# Patient Record
Sex: FEMALE | Race: WHITE | Employment: FULL TIME | URBAN - METROPOLITAN AREA
[De-identification: names, ages, dates, MRNs, and addresses within clinical notes are randomized per-mention and may not be internally consistent; named-entity substitution may affect disease eponyms.]

---

## 2021-10-19 ENCOUNTER — OFFICE VISIT (OUTPATIENT)
Dept: NEUROLOGY | Age: 31
End: 2021-10-19
Payer: COMMERCIAL

## 2021-10-19 VITALS — SYSTOLIC BLOOD PRESSURE: 144 MMHG | RESPIRATION RATE: 20 BRPM | DIASTOLIC BLOOD PRESSURE: 96 MMHG

## 2021-10-19 DIAGNOSIS — S06.0X0A CONCUSSION WITHOUT LOSS OF CONSCIOUSNESS, INITIAL ENCOUNTER: Primary | ICD-10-CM

## 2021-10-19 DIAGNOSIS — G44.86 CERVICOGENIC HEADACHE: ICD-10-CM

## 2021-10-19 DIAGNOSIS — R42 DIZZINESS: ICD-10-CM

## 2021-10-19 DIAGNOSIS — R11.2 INTRACTABLE VOMITING WITH NAUSEA, UNSPECIFIED VOMITING TYPE: ICD-10-CM

## 2021-10-19 PROCEDURE — 99205 OFFICE O/P NEW HI 60 MIN: CPT | Performed by: PSYCHIATRY & NEUROLOGY

## 2021-10-19 RX ORDER — ONDANSETRON 4 MG/1
4 TABLET, ORALLY DISINTEGRATING ORAL
Qty: 60 TABLET | Refills: 0 | Status: SHIPPED | OUTPATIENT
Start: 2021-10-19 | End: 2022-02-28

## 2021-10-19 RX ORDER — AMITRIPTYLINE HYDROCHLORIDE 10 MG/1
TABLET, FILM COATED ORAL
Qty: 90 TABLET | Refills: 1 | Status: SHIPPED | OUTPATIENT
Start: 2021-10-19 | End: 2021-12-01 | Stop reason: SDUPTHER
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
## 2021-10-19 NOTE — LETTER
10/22/2021    Patient: Olivia Marquez   YOB: 1990   Date of Visit: 10/19/2021     [facility] [id number]  Via Fax: [phone]    Dear Reese Mercer MD,      Thank you for referring Ms. Shaye Montoya to [facility] for evaluation. My notes for this consultation are attached. If you have questions, please do not hesitate to call me. I look forward to following your patient along with you.       Sincerely,    Lorenzo Alberts MD

## 2021-10-19 NOTE — PATIENT INSTRUCTIONS
Concussion: Care Instructions  Your Care Instructions     A concussion is a kind of injury to the brain. It happens when the head receives a hard blow. The impact can jar or shake the brain against the skull. This interrupts the brain's normal activities. Although you may have cuts or bruises on your head or face, you may have no other visible signs of a brain injury. In most cases, damage to the brain from a concussion can't be seen in tests such as a CT or MRI scan. For a few weeks, you may have low energy, dizziness, trouble sleeping, a headache, ringing in your ears, or nausea. You may also feel anxious, grumpy, or depressed. You may have problems with memory and concentration. These symptoms are common after a concussion. They should slowly improve over time. Sometimes this takes weeks or even months. Someone who lives with you should know how to care for you. Please share this and all information with a caregiver who will be available to help if needed. Follow-up care is a key part of your treatment and safety. Be sure to make and go to all appointments, and call your doctor if you are having problems. It's also a good idea to know your test results and keep a list of the medicines you take. How can you care for yourself at home? Pain control  · Put ice or a cold pack on the part of your head that hurts for 10 to 20 minutes at a time. Put a thin cloth between the ice and your skin. · Be safe with medicines. Read and follow all instructions on the label. ? If the doctor gave you a prescription medicine for pain, take it as prescribed. ? If you are not taking a prescription pain medicine, ask your doctor if you can take an over-the-counter medicine. Recovery  · Follow your doctor's instructions. He or she will tell you if you need someone to watch you closely for the next 24 hours or longer. · Rest is the best way to recover from a concussion.  You need to rest your body and your brain:  ? Get plenty of sleep at night. And take rest breaks during the day. ? Avoid activities that take a lot of physical or mental work. This includes housework, exercise, schoolwork, video games, text messaging, and using the computer. ? You may need to change your school or work schedule while you recover. ? Return to your normal activities slowly. Do not try to do too much at once. · Do not drink alcohol or use illegal drugs. Alcohol and illegal drugs can slow your recovery. And they can increase your risk of a second brain injury. · Avoid activities that could lead to another concussion. Follow your doctor's instructions for a gradual return to activity and sports. · Ask your doctor when it's okay for you to drive a car, ride a bike, or operate machinery. How should you return to activity? Your return to activity can begin after 1 to 2 days of physical and mental rest. After resting, you can gradually increase your activity as long as it does not cause new symptoms or worsen your symptoms. Doctors and concussion specialists suggest steps to follow for returning to sports after a concussion. Use these steps as a guide. You should slowly progress through the following levels of activity:  1. Limited activity. You can take part in daily activities as long as the activity doesn't increase your symptoms or cause new symptoms. 2. Light aerobic activity. This can include walking, swimming, or other exercise at less than 70% of maximum heart rate. No resistance training is included in this step. 3. Sport-specific exercise. This includes running drills or skating drills (depending on the sport), but no head impact. 4. Noncontact training drills. This includes more complex training drills such as passing. The athlete may also begin light resistance training. 5. Full-contact practice. The athlete can participate in normal training. 6. Return to normal game play.  This is the final step and allows the athlete to join in normal game play. Watch and keep track of your progress. It should take at least 6 days for you to go from light activity to normal game play. Make sure that you can stay at each new level of activity for at least 24 hours without symptoms, or as long as your doctor says, before doing more. If one or more symptoms come back, return to a lower level of activity for at least 24 hours. Don't move on until all symptoms are gone. When should you call for help? Call 911 anytime you think you may need emergency care. For example, call if:    · You have a seizure.     · You passed out (lost consciousness).     · You are confused or can't stay awake. Call your doctor now or seek immediate medical care if:    · You have new or worse vomiting.     · You feel less alert.     · You have new weakness or numbness in any part of your body. Watch closely for changes in your health, and be sure to contact your doctor if:    · You do not get better as expected.     · You have new symptoms, such as headaches, trouble concentrating, or changes in mood. Where can you learn more? Go to http://www.gray.com/  Enter F336 in the search box to learn more about \"Concussion: Care Instructions. \"  Current as of: April 8, 2021               Content Version: 13.0  © 1861-2772 [employer]. Care instructions adapted under license by [employer] (which disclaims liability or warranty for this information). If you have questions about a medical condition or this instruction, always ask your healthcare professional. [place] [address] any warranty or liability for your use of this information. Neck: Exercises  Introduction  Here are some examples of exercises for you to try. The exercises may be suggested for a condition or for rehabilitation. Start each exercise slowly. Ease off the exercises if you start to have pain.   You will be told when to start these exercises and which ones will work best for you. How to do the exercises  Neck stretch    1. This stretch works best if you keep your shoulder down as you lean away from it. To help you remember to do this, start by relaxing your shoulders and lightly holding on to your thighs or your chair. 2. Tilt your head toward your shoulder and hold for 15 to 30 seconds. Let the weight of your head stretch your muscles. 3. If you would like a little added stretch, use your hand to gently and steadily pull your head toward your shoulder. For example, keeping your right shoulder down, lean your head to the left. 4. Repeat 2 to 4 times toward each shoulder. Diagonal neck stretch    1. Turn your head slightly toward the direction you will be stretching, and tilt your head diagonally toward your chest and hold for 15 to 30 seconds. 2. If you would like a little added stretch, use your hand to gently and steadily pull your head forward on the diagonal.  3. Repeat 2 to 4 times toward each side. Dorsal glide stretch    The dorsal glide stretches the back of the neck. If you feel pain, do not glide so far back. Some people find this exercise easier to do while lying on their backs with an ice pack on the neck. 1. Sit or stand tall and look straight ahead. 2. Slowly tuck your chin as you glide your head backward over your body  3. Hold for a count of 6, and then relax for up to 10 seconds. 4. Repeat 8 to 12 times. Chest and shoulder stretch    1. Sit or stand tall and glide your head backward as in the dorsal glide stretch. 2. Raise both arms so that your hands are next to your ears. 3. Take a deep breath, and as you breathe out, lower your elbows down and behind your back. You will feel your shoulder blades slide down and together, and at the same time you will feel a stretch across your chest and the front of your shoulders. 4. Hold for about 6 seconds, and then relax for up to 10 seconds. 5. Repeat 8 to 12 times.   Strengthening: Hands on head    1. Move your head backward, forward, and side to side against gentle pressure from your hands, holding each position for about 6 seconds. 2. Repeat 8 to 12 times. Follow-up care is a key part of your treatment and safety. Be sure to make and go to all appointments, and call your doctor if you are having problems. It's also a good idea to know your test results and keep a list of the medicines you take. Where can you learn more? Go to http://www.worley.com/  Enter P975 in the search box to learn more about \"Neck: Exercises. \"  Current as of: July 1, 2021               Content Version: 13.0  © 9374-8167 [employer]. Care instructions adapted under license by [employer] (which disclaims liability or warranty for this information). If you have questions about a medical condition or this instruction, always ask your healthcare professional. [place] [address] any warranty or liability for your use of this information. Shoulder Blade: Exercises  Introduction  Here are some examples of exercises for you to try. The exercises may be suggested for a condition or for rehabilitation. Start each exercise slowly. Ease off the exercises if you start to have pain. You will be told when to start these exercises and which ones will work best for you. How to do the exercises  Shoulder roll    1. Stand tall with your chin slightly tucked. Imagine that a string at the top of your head is pulling you straight up. 2. Keep your arms relaxed. All motion will be in your shoulders. 3. Shrug your shoulders up toward your ears, then up and back. [place] your shoulders down and back, like you're sliding your hands down into your back pants pockets. 4. Repeat the circles at least 2 to 4 times. 5. This exercise is also helpful anytime you want to relax. Lower neck and upper back stretch    1.  With your arms about shoulder height, clasp your hands in front of you. 2. Drop your chin toward your chest.  3. Reach straight forward so you are rounding your upper back. Think about pulling your shoulder blades apart. [place]ina [place] feel a stretch across your upper back and shoulders. Hold for at least 6 seconds. 4. Repeat 2 to 4 times. Triceps stretch    1. Reach your arm straight up. 2. Keeping your elbow in place, bend your arm and reach your hand down behind your back. 3. With your other hand, apply gentle pressure to the bent elbow. [place] feel a stretch at the back of your upper arm and shoulder. Hold about 6 seconds. 4. Repeat 2 to 4 times with each arm. Shoulder stretch    1. Relax your shoulders. 2. Raise one arm to shoulder height, and reach it across your chest.  3. Pull the arm slightly toward you with your other arm. This will help you get a gentle stretch. Hold for about 6 seconds. 4. Repeat 2 to 4 times. Shoulder blade squeeze    1. Sit or stand up tall with your arms at your sides. 2. Keep your shoulders relaxed and down, not shrugged. 3. Squeeze your shoulder blades together. Hold for 6 seconds, then relax. 4. Repeat 8 to 12 times. Straight-arm shoulder blade squeeze    1. Sit or stand tall. Relax your shoulders. 2. With palms down, hold your elastic tubing or band straight out in front of you. 3. Start with slight tension in the tubing or band, with your hands about shoulder-width apart. 4. Slowly pull straight out to the sides, squeezing your shoulder blades together. Keep your arms straight and at shoulder height. Slowly release. 5. Repeat 8 to 12 times. Rowing    1. [place] your elastic tubing or band at about waist height. Take one end in each hand. 2. Sit or stand with your feet hip-width apart. 3. Hold your arms straight in front of you. Adjust your distance to create slight tension in the tubing or band. 4. Slightly tuck your chin. Relax your shoulders. 5. Without shrugging your shoulders, pull straight back.  Your elbows will pass alongside your waist.  Pull-downs    1. [place] your elastic tubing or band in the top of a closed door. Take one end in each hand. 2. Either sit or stand, depending on what is more comfortable. If you feel unsteady, sit on a chair. 3. Start with your arms up and comfortably apart, elbows straight. There should be a slight tension in the tubing or band. 4. Slightly tuck your chin, and look straight ahead. 5. Keeping your back straight, slowly pull down and back, bending your elbows. 6. Stop where your hands are level with your chin, in a \"goalpost\" position. 7. Repeat 8 to 12 times. Chest T stretch    1. Lie on your back. Raise your knees so they are bent. Plant your feet on the floor, hip-width apart. 2. Tuck your chin, and relax your shoulders. 3. Reach your arms straight out to the sides. If you don't feel a mild stretch in your shoulders and across your chest, use a foam roll or a tightly rolled blanket under your spine, from your tailbone to your head. 4. Relax in this position for at least 15 to 30 seconds while you breathe normally. Repeat 2 to 4 times. 5. As you get used to this stretch, keep adding a little more time until you are able relax in this position for 2 or 3 minutes. When you can relax for at least 2 minutes, you only need to do the exercise 1 time per session. Chest goalpost stretch    1. Lie on your back. Raise your knees so they are bent. Plant your feet on the floor, hip-width apart. 2. Tuck your chin, and relax your shoulders. 3. Reach your arms straight out to the sides. 4. Bend your arms at the elbows, with your hands pointed toward the top of your head. Your arms should make an L on either side of your head. Your palms should be facing up. 5. If you don't feel a mild stretch in your shoulders and across your chest, use a foam roll or tightly rolled blanket under your spine, from your tailbone to your head.   6. Relax in this position for at least 15 to 30 seconds while you breathe normally. Repeat 2 to 4 times. 7. Each day you do this exercise, add a little more time until you can relax in this position for 2 or 3 minutes. When you can relax for at least 2 minutes, you only need to do the exercise 1 time per session. Follow-up care is a key part of your treatment and safety. Be sure to make and go to all appointments, and call your doctor if you are having problems. It's also a good idea to know your test results and keep a list of the medicines you take. Where can you learn more? Go to http://www.gray.com/  Enter H745 in the search box to learn more about \"Shoulder Blade: Exercises. \"  Current as of: July 1, 2021               Content Version: 13.0  © 2006-2021 [employer]. Care instructions adapted under license by [employer] (which disclaims liability or warranty for this information). If you have questions about a medical condition or this instruction, always ask your healthcare professional. [address] any warranty or liability for your use of this information.

## 2021-11-02 ENCOUNTER — OFFICE VISIT (OUTPATIENT)
Dept: NEUROLOGY | Age: 31
End: 2021-11-02
Payer: COMMERCIAL

## 2021-11-02 VITALS — RESPIRATION RATE: 20 BRPM | SYSTOLIC BLOOD PRESSURE: 150 MMHG | DIASTOLIC BLOOD PRESSURE: 94 MMHG

## 2021-11-02 DIAGNOSIS — S06.0X0A CONCUSSION WITHOUT LOSS OF CONSCIOUSNESS, INITIAL ENCOUNTER: Primary | ICD-10-CM

## 2021-11-02 DIAGNOSIS — R42 DIZZINESS: ICD-10-CM

## 2021-11-02 DIAGNOSIS — G44.86 CERVICOGENIC HEADACHE: ICD-10-CM

## 2021-11-02 DIAGNOSIS — R11.2 INTRACTABLE VOMITING WITH NAUSEA, UNSPECIFIED VOMITING TYPE: ICD-10-CM

## 2021-11-02 PROCEDURE — 99214 OFFICE O/P EST MOD 30 MIN: CPT | Performed by: PSYCHIATRY & NEUROLOGY

## 2021-11-02 NOTE — LETTER
11/2/2021 Patient: Nalini Vinson YOB: 1990 Date of Visit: 11/2/2021 Alfredia Jeans, [facility] [phone] [address] Fax: [phone] Dear Alfredia Jeans, MD, Thank you for referring Ms. Ramses Greenfield to [facility] for evaluation. My notes for this consultation are attached. If you have questions, please do not hesitate to call me. I look forward to following your patient along with you. Sincerely, Dede Loco MD
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
## 2021-12-01 ENCOUNTER — OFFICE VISIT (OUTPATIENT)
Dept: NEUROLOGY | Age: 31
End: 2021-12-01
Payer: COMMERCIAL

## 2021-12-01 VITALS
OXYGEN SATURATION: 98 % | DIASTOLIC BLOOD PRESSURE: 84 MMHG | SYSTOLIC BLOOD PRESSURE: 118 MMHG | TEMPERATURE: 97.5 F | HEART RATE: 75 BPM

## 2021-12-01 DIAGNOSIS — R11.0 NAUSEA: ICD-10-CM

## 2021-12-01 DIAGNOSIS — F07.81 POSTCONCUSSION SYNDROME: Primary | ICD-10-CM

## 2021-12-01 DIAGNOSIS — R45.1 RESTLESSNESS: ICD-10-CM

## 2021-12-01 DIAGNOSIS — R42 DIZZINESS: ICD-10-CM

## 2021-12-01 DIAGNOSIS — G44.86 CERVICOGENIC HEADACHE: ICD-10-CM

## 2021-12-01 PROCEDURE — 99214 OFFICE O/P EST MOD 30 MIN: CPT | Performed by: PSYCHIATRY & NEUROLOGY

## 2021-12-01 RX ORDER — TIZANIDINE HYDROCHLORIDE 4 MG/1
4 CAPSULE, GELATIN COATED ORAL AS NEEDED
COMMUNITY

## 2021-12-01 RX ORDER — AMITRIPTYLINE HYDROCHLORIDE 25 MG/1
50 TABLET, FILM COATED ORAL
Qty: 60 TABLET | Refills: 3 | Status: SHIPPED | OUTPATIENT
Start: 2021-12-01

## 2021-12-01 RX ORDER — SUMATRIPTAN 25 MG/1
25 TABLET, FILM COATED ORAL
COMMUNITY

## 2021-12-01 RX ORDER — SERTRALINE HYDROCHLORIDE 100 MG/1
50 TABLET, FILM COATED ORAL DAILY
COMMUNITY
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
## 2021-12-01 NOTE — PROGRESS NOTES
Chief Complaint   Patient presents with    Follow-up     Follow up concussion; still having dizziness and headaches, slight improvement     Visit Vitals  /84 (BP 1 Location: Left arm, BP Patient Position: Sitting, BP Cuff Size: Adult)   Pulse 75   Temp 97.5 °F (36.4 °C) (Temporal)   SpO2 98%

## 2022-02-28 ENCOUNTER — OFFICE VISIT (OUTPATIENT)
Dept: NEUROLOGY | Age: 32
End: 2022-02-28
Payer: MEDICAID

## 2022-02-28 VITALS — RESPIRATION RATE: 20 BRPM | SYSTOLIC BLOOD PRESSURE: 170 MMHG | DIASTOLIC BLOOD PRESSURE: 96 MMHG

## 2022-02-28 DIAGNOSIS — G44.86 CERVICOGENIC HEADACHE: ICD-10-CM

## 2022-02-28 DIAGNOSIS — F07.81 POSTCONCUSSION SYNDROME: Primary | ICD-10-CM

## 2022-02-28 DIAGNOSIS — G25.81 RLS (RESTLESS LEGS SYNDROME): ICD-10-CM

## 2022-02-28 DIAGNOSIS — R42 DIZZINESS: ICD-10-CM

## 2022-02-28 PROCEDURE — 99214 OFFICE O/P EST MOD 30 MIN: CPT | Performed by: PSYCHIATRY & NEUROLOGY

## 2022-02-28 RX ORDER — LEVONORGESTREL / ETHINYL ESTRADIOL AND ETHINYL ESTRADIOL 150-30(84)
KIT ORAL
COMMUNITY
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
## 2022-02-28 NOTE — LETTER
2/28/2022    Patient: Cornelio Hall   YOB: 1990   Date of Visit: 2/28/2022     [address] [place] [facility] [address]  Via Fax: [phone]    Dear Birgit Erazo MD,      Thank you for referring Ms. Neela Lyle to [facility] for evaluation. My notes for this consultation are attached. If you have questions, please do not hesitate to call me. I look forward to following your patient along with you.       Sincerely,    Cristina Hayes MD

## 2023-06-06 LAB
ABO, EXTERNAL RESULT: NORMAL
HEP B, EXTERNAL RESULT: NEGATIVE
HEPATITIS C ANTIBODY, EXTERNAL RESULT: NORMAL
HIV, EXTERNAL RESULT: NORMAL
RH FACTOR, EXTERNAL RESULT: POSITIVE
RPR, EXTERNAL RESULT: NORMAL
RUBELLA TITER, EXTERNAL RESULT: NORMAL

## 2023-08-07 ENCOUNTER — HOSPITAL ENCOUNTER (OUTPATIENT)
Facility: HOSPITAL | Age: 33
Discharge: HOME | End: 2023-08-10
Attending: SPECIALIST
Payer: COMMERCIAL

## 2023-08-07 DIAGNOSIS — K80.51: ICD-10-CM

## 2023-08-07 PROCEDURE — 76705 ECHO EXAM OF ABDOMEN: CPT

## 2023-10-06 ENCOUNTER — TELEPHONE (OUTPATIENT)
Age: 33
End: 2023-10-06

## 2023-10-06 NOTE — TELEPHONE ENCOUNTER
----- Message from Arti Martina sent at 10/6/2023  2:05 PM EDT -----  Subject: Appointment Request    Reason for Call: New Patient/New to Provider Appointment needed: New   Patient Request Appointment    QUESTIONS    Reason for appointment request? No appointments available during search     Additional Information for Provider? Patient would like to establish with   Dr Prakash Ingram.  Please advise.   ---------------------------------------------------------------------------  --------------  Charles Anders [facility]  [phone]; OK to leave message on voicemail  ---------------------------------------------------------------------------  --------------  SCRIPT ANSWERS

## 2023-11-06 ASSESSMENT — ENCOUNTER SYMPTOMS
NAUSEA: 0
WHEEZING: 0
BACK PAIN: 0
VOMITING: 0
SHORTNESS OF BREATH: 0

## 2023-11-06 NOTE — PROGRESS NOTES
HISTORY OF PRESENT ILLNESS  Jarrett Tidwell is a 34 y.o. female. HPI    This is an established visit completed with telemedicine was completed with video assist. The patient acknowledges and agrees to this method of visitation. Pt is here to establish care. Previously saw Dr Marc Mustafa, last there 23    PMHx:    Htn  BP at home 135/70 137/88  Labetalol 100mg once daily and nifedipine 30mg BID    Wt is 165 lbs per pt    She is currently pregnant with her second child, 28 weeks. Due date 23  She is seeing Dr Silvio Gardiner group monthly, will be seeing them weekly starting   Reviewed US abdomen : IMPRESSION:  Unremarkable right upper quadrant sonogram.    Hx of anxiety and ADHD  Also has migraines was on Elavil  She was on zoloft and adderall, stopped during pregnancy  Previously used amitriptyline for HA, not currently   She is taking requip for RLS, she is aware of risks with pregnancy but this was discussed with her  group and they decided to let her continue to take the Requip takes 1 mg nightly  She sees a therapist   She was told to see a neurologist for RLS, referred to Dr Pop Nguyen     She was in [address] [facility] 2020    She saw EDUAR Skinner (ortho) 23 for R wrist pain   Reviewed note:  ASSESSMENT:  1. Right wrist pain   2. Right wrist tendinitis     Patient Active Problem List   Diagnosis    Asthma     PLAN:  Treatment Plan:   Patient Instructions   The patient will follow up with Dr. Simona Gaston from pain worsens or persists. The patient declined a scheduled follow-up at this time. The patient will call [phone] to schedule if needed. The patient will ice 20 minute on 20 minute off twice daily or more as needed. The patient was given a prescription for diclofenac. The patient was prescribed a Velcro wrist splint.  The patient requires this semi-rigid orthosis to improve function    Orders Placed This Encounter    X-ray wrist right 3+ views ([id number])

## 2023-11-07 PROBLEM — I10 PRIMARY HYPERTENSION: Status: ACTIVE | Noted: 2023-11-07

## 2023-11-07 PROBLEM — F41.9 ANXIETY: Status: ACTIVE | Noted: 2023-11-07

## 2023-11-10 ENCOUNTER — TELEMEDICINE (OUTPATIENT)
Age: 33
End: 2023-11-10
Payer: COMMERCIAL

## 2023-11-10 DIAGNOSIS — F90.9 ATTENTION DEFICIT HYPERACTIVITY DISORDER (ADHD), UNSPECIFIED ADHD TYPE: ICD-10-CM

## 2023-11-10 DIAGNOSIS — F41.9 ANXIETY: ICD-10-CM

## 2023-11-10 DIAGNOSIS — I10 PRIMARY HYPERTENSION: Primary | ICD-10-CM

## 2023-11-10 DIAGNOSIS — G44.229 CHRONIC TENSION-TYPE HEADACHE, NOT INTRACTABLE: ICD-10-CM

## 2023-11-10 DIAGNOSIS — J45.20 MILD INTERMITTENT ASTHMA WITHOUT COMPLICATION: ICD-10-CM

## 2023-11-10 DIAGNOSIS — G25.81 RLS (RESTLESS LEGS SYNDROME): ICD-10-CM

## 2023-11-10 PROCEDURE — 99204 OFFICE O/P NEW MOD 45 MIN: CPT | Performed by: INTERNAL MEDICINE

## 2023-11-10 RX ORDER — BUTALBITAL, ACETAMINOPHEN, CAFFEINE AND CODEINE PHOSPHATE 300; 50; 40; 30 MG/1; MG/1; MG/1; MG/1
1 CAPSULE ORAL EVERY 4 HOURS PRN
COMMUNITY

## 2023-11-10 RX ORDER — ROPINIROLE 1 MG/1
TABLET, FILM COATED ORAL
COMMUNITY
Start: 2023-01-13

## 2023-11-10 RX ORDER — ASPIRIN 81 MG/1
81 TABLET, CHEWABLE ORAL DAILY
COMMUNITY

## 2023-11-10 RX ORDER — LABETALOL 100 MG/1
100 TABLET, FILM COATED ORAL 2 TIMES DAILY
COMMUNITY
Start: 2023-10-20

## 2023-11-10 RX ORDER — ONDANSETRON 8 MG/1
8 TABLET, ORALLY DISINTEGRATING ORAL EVERY 8 HOURS PRN
COMMUNITY
Start: 2023-11-02

## 2023-11-10 RX ORDER — NIFEDIPINE 30 MG/1
30 TABLET, EXTENDED RELEASE ORAL 2 TIMES DAILY
COMMUNITY
Start: 2023-11-02

## 2023-11-10 RX ORDER — BUDESONIDE AND FORMOTEROL FUMARATE DIHYDRATE 160; 4.5 UG/1; UG/1
AEROSOL RESPIRATORY (INHALATION)
COMMUNITY
Start: 2022-12-21

## 2023-11-10 SDOH — HEALTH STABILITY: PHYSICAL HEALTH: ON AVERAGE, HOW MANY DAYS PER WEEK DO YOU ENGAGE IN MODERATE TO STRENUOUS EXERCISE (LIKE A BRISK WALK)?: 1 DAY

## 2023-11-10 SDOH — ECONOMIC STABILITY: FOOD INSECURITY: WITHIN THE PAST 12 MONTHS, YOU WORRIED THAT YOUR FOOD WOULD RUN OUT BEFORE YOU GOT MONEY TO BUY MORE.: NEVER TRUE

## 2023-11-10 SDOH — HEALTH STABILITY: PHYSICAL HEALTH: ON AVERAGE, HOW MANY MINUTES DO YOU ENGAGE IN EXERCISE AT THIS LEVEL?: 10 MIN

## 2023-11-10 SDOH — ECONOMIC STABILITY: HOUSING INSECURITY
IN THE LAST 12 MONTHS, WAS THERE A TIME WHEN YOU DID NOT HAVE A STEADY PLACE TO SLEEP OR SLEPT IN A SHELTER (INCLUDING NOW)?: NO

## 2023-11-10 SDOH — ECONOMIC STABILITY: FOOD INSECURITY: WITHIN THE PAST 12 MONTHS, THE FOOD YOU BOUGHT JUST DIDN'T LAST AND YOU DIDN'T HAVE MONEY TO GET MORE.: NEVER TRUE

## 2023-11-10 SDOH — ECONOMIC STABILITY: INCOME INSECURITY: HOW HARD IS IT FOR YOU TO PAY FOR THE VERY BASICS LIKE FOOD, HOUSING, MEDICAL CARE, AND HEATING?: NOT HARD AT ALL

## 2023-11-10 ASSESSMENT — SOCIAL DETERMINANTS OF HEALTH (SDOH)
WITHIN THE LAST YEAR, HAVE YOU BEEN HUMILIATED OR EMOTIONALLY ABUSED IN OTHER WAYS BY YOUR PARTNER OR EX-PARTNER?: NO
WITHIN THE LAST YEAR, HAVE YOU BEEN AFRAID OF YOUR PARTNER OR EX-PARTNER?: NO
WITHIN THE LAST YEAR, HAVE TO BEEN RAPED OR FORCED TO HAVE ANY KIND OF SEXUAL ACTIVITY BY YOUR PARTNER OR EX-PARTNER?: NO
WITHIN THE LAST YEAR, HAVE YOU BEEN KICKED, HIT, SLAPPED, OR OTHERWISE PHYSICALLY HURT BY YOUR PARTNER OR EX-PARTNER?: NO

## 2023-11-10 ASSESSMENT — PATIENT HEALTH QUESTIONNAIRE - PHQ9
SUM OF ALL RESPONSES TO PHQ9 QUESTIONS 1 & 2: 0
SUM OF ALL RESPONSES TO PHQ QUESTIONS 1-9: 0
2. FEELING DOWN, DEPRESSED OR HOPELESS: 0
1. LITTLE INTEREST OR PLEASURE IN DOING THINGS: 0
SUM OF ALL RESPONSES TO PHQ QUESTIONS 1-9: 0

## 2023-11-10 NOTE — PROGRESS NOTES
1. \"Have you been to the ER, urgent care clinic since your last visit? Hospitalized since your last visit? \" No    2. \"Have you seen or consulted any other health care providers outside of the [address] [place] [address] since your last visit? \" No     3. For patients aged 43-73: Has the patient had a colonoscopy / FIT/ Cologuard? NA - based on age      If the patient is female:    4. For patients aged 43-66: Has the patient had a mammogram within the past 2 years? NA - based on age or sex      11. For patients aged 21-65: Has the patient had a pap smear? Yes - Care Gap present.  Most recent result on file

## 2023-11-17 ENCOUNTER — HOSPITAL ENCOUNTER (OUTPATIENT)
Facility: HOSPITAL | Age: 33
Setting detail: OBSERVATION
Discharge: HOME | End: 2023-11-18
Attending: SPECIALIST | Admitting: SPECIALIST
Payer: COMMERCIAL

## 2023-11-17 PROBLEM — O11.9 PREECLAMPSIA COMPLICATING HYPERTENSION: Status: ACTIVE | Noted: 2023-11-17

## 2023-11-17 LAB
ALBUMIN SERPL-MCNC: 2.7 G/DL (ref 3.5–5)
ALBUMIN/GLOB SERPL: 0.6 (ref 1.1–2.2)
ALP SERPL-CCNC: 131 U/L (ref 45–117)
ALT SERPL-CCNC: 27 U/L (ref 12–78)
ANION GAP SERPL CALC-SCNC: 6 MMOL/L (ref 5–15)
AST SERPL-CCNC: 22 U/L (ref 15–37)
BILIRUB SERPL-MCNC: 0.5 MG/DL (ref 0.2–1)
BUN SERPL-MCNC: 11 MG/DL (ref 6–20)
BUN/CREAT SERPL: 15 (ref 12–20)
CALCIUM SERPL-MCNC: 9 MG/DL (ref 8.5–10.1)
CHLORIDE SERPL-SCNC: 107 MMOL/L (ref 97–108)
CO2 SERPL-SCNC: 23 MMOL/L (ref 21–32)
CREAT SERPL-MCNC: 0.74 MG/DL (ref 0.55–1.02)
CREAT UR-MCNC: 46.9 MG/DL
ERYTHROCYTE [DISTWIDTH] IN BLOOD BY AUTOMATED COUNT: 12.4 % (ref 11.5–14.5)
GLOBULIN SER CALC-MCNC: 4.6 G/DL (ref 2–4)
GLUCOSE SERPL-MCNC: 122 MG/DL (ref 65–100)
HCT VFR BLD AUTO: 30.9 % (ref 35–47)
HGB BLD-MCNC: 10.6 G/DL (ref 11.5–16)
MCH RBC QN AUTO: 29.4 PG (ref 26–34)
MCHC RBC AUTO-ENTMCNC: 34.3 G/DL (ref 30–36.5)
MCV RBC AUTO: 85.8 FL (ref 80–99)
NRBC # BLD: 0 K/UL (ref 0–0.01)
NRBC BLD-RTO: 0 PER 100 WBC
PLATELET # BLD AUTO: 458 K/UL (ref 150–400)
PMV BLD AUTO: 9.3 FL (ref 8.9–12.9)
POTASSIUM SERPL-SCNC: 3.9 MMOL/L (ref 3.5–5.1)
PROT SERPL-MCNC: 7.3 G/DL (ref 6.4–8.2)
PROT UR-MCNC: 13 MG/DL (ref 0–11.9)
PROT/CREAT UR-RTO: 0.3
RBC # BLD AUTO: 3.6 M/UL (ref 3.8–5.2)
SODIUM SERPL-SCNC: 136 MMOL/L (ref 136–145)
WBC # BLD AUTO: 14 K/UL (ref 3.6–11)

## 2023-11-17 PROCEDURE — 36415 COLL VENOUS BLD VENIPUNCTURE: CPT

## 2023-11-17 PROCEDURE — 82570 ASSAY OF URINE CREATININE: CPT

## 2023-11-17 PROCEDURE — 6360000002 HC RX W HCPCS: Performed by: SPECIALIST

## 2023-11-17 PROCEDURE — 99213 OFFICE O/P EST LOW 20 MIN: CPT

## 2023-11-17 PROCEDURE — G0378 HOSPITAL OBSERVATION PER HR: HCPCS

## 2023-11-17 PROCEDURE — 84156 ASSAY OF PROTEIN URINE: CPT

## 2023-11-17 PROCEDURE — 96372 THER/PROPH/DIAG INJ SC/IM: CPT

## 2023-11-17 PROCEDURE — 6370000000 HC RX 637 (ALT 250 FOR IP): Performed by: SPECIALIST

## 2023-11-17 PROCEDURE — 85027 COMPLETE CBC AUTOMATED: CPT

## 2023-11-17 PROCEDURE — 80053 COMPREHEN METABOLIC PANEL: CPT

## 2023-11-17 RX ORDER — AZITHROMYCIN 250 MG/1
500 TABLET, FILM COATED ORAL ONCE
Status: DISCONTINUED | OUTPATIENT
Start: 2023-11-17 | End: 2023-11-17

## 2023-11-17 RX ORDER — BUTALBITAL, ACETAMINOPHEN AND CAFFEINE 50; 325; 40 MG/1; MG/1; MG/1
1 TABLET ORAL EVERY 4 HOURS PRN
Status: DISCONTINUED | OUTPATIENT
Start: 2023-11-17 | End: 2023-11-18 | Stop reason: HOSPADM

## 2023-11-17 RX ORDER — DIPHENHYDRAMINE HCL 25 MG
50 CAPSULE ORAL NIGHTLY PRN
Status: DISCONTINUED | OUTPATIENT
Start: 2023-11-17 | End: 2023-11-17

## 2023-11-17 RX ORDER — SUMATRIPTAN 25 MG/1
50 TABLET, FILM COATED ORAL ONCE
Status: COMPLETED | OUTPATIENT
Start: 2023-11-17 | End: 2023-11-17

## 2023-11-17 RX ORDER — BETAMETHASONE SODIUM PHOSPHATE AND BETAMETHASONE ACETATE 3; 3 MG/ML; MG/ML
12 INJECTION, SUSPENSION INTRA-ARTICULAR; INTRALESIONAL; INTRAMUSCULAR; SOFT TISSUE EVERY 24 HOURS
Status: DISCONTINUED | OUTPATIENT
Start: 2023-11-17 | End: 2023-11-18 | Stop reason: HOSPADM

## 2023-11-17 RX ORDER — ALBUTEROL SULFATE 2.5 MG/3ML
2.5 SOLUTION RESPIRATORY (INHALATION) EVERY 4 HOURS PRN
Status: DISCONTINUED | OUTPATIENT
Start: 2023-11-17 | End: 2023-11-18 | Stop reason: HOSPADM

## 2023-11-17 RX ORDER — BUDESONIDE AND FORMOTEROL FUMARATE DIHYDRATE 160; 4.5 UG/1; UG/1
2 AEROSOL RESPIRATORY (INHALATION) 2 TIMES DAILY
Status: DISCONTINUED | OUTPATIENT
Start: 2023-11-17 | End: 2023-11-17

## 2023-11-17 RX ORDER — ACETAMINOPHEN 325 MG/1
650 TABLET ORAL EVERY 4 HOURS PRN
Status: DISCONTINUED | OUTPATIENT
Start: 2023-11-17 | End: 2023-11-18 | Stop reason: HOSPADM

## 2023-11-17 RX ORDER — ONDANSETRON 4 MG/1
8 TABLET, ORALLY DISINTEGRATING ORAL EVERY 8 HOURS PRN
Status: DISCONTINUED | OUTPATIENT
Start: 2023-11-17 | End: 2023-11-18 | Stop reason: HOSPADM

## 2023-11-17 RX ORDER — AZITHROMYCIN 250 MG/1
250 TABLET, FILM COATED ORAL DAILY
Status: DISCONTINUED | OUTPATIENT
Start: 2023-11-18 | End: 2023-11-17

## 2023-11-17 RX ORDER — BUTALBITAL, ACETAMINOPHEN, CAFFEINE AND CODEINE PHOSPHATE 300; 50; 40; 30 MG/1; MG/1; MG/1; MG/1
1 CAPSULE ORAL EVERY 4 HOURS PRN
Status: DISCONTINUED | OUTPATIENT
Start: 2023-11-17 | End: 2023-11-17 | Stop reason: CLARIF

## 2023-11-17 RX ORDER — AZITHROMYCIN 250 MG/1
250 TABLET, FILM COATED ORAL DAILY
Status: DISCONTINUED | OUTPATIENT
Start: 2023-11-18 | End: 2023-11-18 | Stop reason: HOSPADM

## 2023-11-17 RX ORDER — ASPIRIN 81 MG/1
81 TABLET, CHEWABLE ORAL DAILY
Status: DISCONTINUED | OUTPATIENT
Start: 2023-11-17 | End: 2023-11-18 | Stop reason: HOSPADM

## 2023-11-17 RX ORDER — NIFEDIPINE 30 MG/1
30 TABLET, EXTENDED RELEASE ORAL 2 TIMES DAILY
Status: DISCONTINUED | OUTPATIENT
Start: 2023-11-17 | End: 2023-11-18 | Stop reason: HOSPADM

## 2023-11-17 RX ORDER — ROPINIROLE 1 MG/1
1 TABLET, FILM COATED ORAL NIGHTLY
Status: DISCONTINUED | OUTPATIENT
Start: 2023-11-17 | End: 2023-11-18 | Stop reason: HOSPADM

## 2023-11-17 RX ORDER — LABETALOL 100 MG/1
100 TABLET, FILM COATED ORAL 2 TIMES DAILY
Status: DISCONTINUED | OUTPATIENT
Start: 2023-11-17 | End: 2023-11-17

## 2023-11-17 RX ORDER — SUMATRIPTAN 25 MG/1
50 TABLET, FILM COATED ORAL DAILY PRN
Status: DISCONTINUED | OUTPATIENT
Start: 2023-11-17 | End: 2023-11-18 | Stop reason: HOSPADM

## 2023-11-17 RX ORDER — LABETALOL 100 MG/1
100 TABLET, FILM COATED ORAL ONCE
Status: COMPLETED | OUTPATIENT
Start: 2023-11-18 | End: 2023-11-18

## 2023-11-17 RX ORDER — ALBUTEROL SULFATE 90 UG/1
2 AEROSOL, METERED RESPIRATORY (INHALATION) EVERY 4 HOURS PRN
Status: DISCONTINUED | OUTPATIENT
Start: 2023-11-17 | End: 2023-11-17

## 2023-11-17 RX ADMIN — NIFEDIPINE 30 MG: 30 TABLET, EXTENDED RELEASE ORAL at 21:06

## 2023-11-17 RX ADMIN — SUMATRIPTAN SUCCINATE 50 MG: 25 TABLET ORAL at 16:35

## 2023-11-17 RX ADMIN — BETAMETHASONE SODIUM PHOSPHATE AND BETAMETHASONE ACETATE 12 MG: 3; 3 INJECTION, SUSPENSION INTRA-ARTICULAR; INTRALESIONAL; INTRAMUSCULAR at 17:56

## 2023-11-17 RX ADMIN — ROPINIROLE HYDROCHLORIDE 1 MG: 1 TABLET, FILM COATED ORAL at 21:57

## 2023-11-17 RX ADMIN — ASPIRIN 81 MG: 81 TABLET, CHEWABLE ORAL at 19:12

## 2023-11-17 ASSESSMENT — PAIN DESCRIPTION - LOCATION: LOCATION: HEAD

## 2023-11-17 ASSESSMENT — PAIN SCALES - GENERAL: PAINLEVEL_OUTOF10: 4

## 2023-11-17 ASSESSMENT — PAIN DESCRIPTION - DESCRIPTORS: DESCRIPTORS: ACHING

## 2023-11-17 NOTE — PROGRESS NOTES
Patient arrived to L&D triage with complaints headaches since yesterday afternoon and elevated blood pressures at home. Patient of Dr. Marcie Elizabeth at [facility] [place], RAY of 1/29/24. Denies vision changes, RUQ pain, vaginal bleeding, contractions, LOF. Confirms fetal movement. 1500 - Dr. Marcie Elizabeth made aware of patient arrival. Verbal orders for [facility] labs    1530 - Dr. Marcie Elizabeth at bedside.      1550 - report given to Bethany Melendez RN for transfer of care

## 2023-11-18 VITALS
DIASTOLIC BLOOD PRESSURE: 67 MMHG | WEIGHT: 168 LBS | RESPIRATION RATE: 18 BRPM | OXYGEN SATURATION: 100 % | BODY MASS INDEX: 28.68 KG/M2 | HEART RATE: 85 BPM | TEMPERATURE: 97.5 F | SYSTOLIC BLOOD PRESSURE: 132 MMHG | HEIGHT: 64 IN

## 2023-11-18 PROCEDURE — 6370000000 HC RX 637 (ALT 250 FOR IP): Performed by: SPECIALIST

## 2023-11-18 PROCEDURE — 96372 THER/PROPH/DIAG INJ SC/IM: CPT

## 2023-11-18 PROCEDURE — G0378 HOSPITAL OBSERVATION PER HR: HCPCS

## 2023-11-18 PROCEDURE — 6360000002 HC RX W HCPCS: Performed by: SPECIALIST

## 2023-11-18 RX ORDER — BUTALBITAL, ACETAMINOPHEN AND CAFFEINE 50; 325; 40 MG/1; MG/1; MG/1
1 TABLET ORAL EVERY 4 HOURS PRN
Qty: 20 TABLET | Refills: 0 | Status: SHIPPED | OUTPATIENT
Start: 2023-11-18

## 2023-11-18 RX ADMIN — ASPIRIN 81 MG: 81 TABLET, CHEWABLE ORAL at 08:07

## 2023-11-18 RX ADMIN — AZITHROMYCIN 250 MG: 250 TABLET, FILM COATED ORAL at 00:56

## 2023-11-18 RX ADMIN — AZITHROMYCIN 250 MG: 250 TABLET, FILM COATED ORAL at 12:45

## 2023-11-18 RX ADMIN — BETAMETHASONE SODIUM PHOSPHATE AND BETAMETHASONE ACETATE 12 MG: 3; 3 INJECTION, SUSPENSION INTRA-ARTICULAR; INTRALESIONAL; INTRAMUSCULAR at 11:51

## 2023-11-18 RX ADMIN — BUTALBITAL, ACETAMINOPHEN, AND CAFFEINE 1 TABLET: 50; 325; 40 TABLET ORAL at 12:44

## 2023-11-18 RX ADMIN — NIFEDIPINE 30 MG: 30 TABLET, EXTENDED RELEASE ORAL at 08:07

## 2023-11-18 RX ADMIN — LABETALOL HYDROCHLORIDE 100 MG: 100 TABLET, FILM COATED ORAL at 08:07

## 2023-11-18 ASSESSMENT — PAIN SCALES - GENERAL: PAINLEVEL_OUTOF10: 4

## 2023-11-18 ASSESSMENT — PAIN DESCRIPTION - ORIENTATION: ORIENTATION: RIGHT;LEFT

## 2023-11-18 ASSESSMENT — PAIN DESCRIPTION - DESCRIPTORS: DESCRIPTORS: ACHING;DULL;THROBBING

## 2023-11-18 ASSESSMENT — PAIN DESCRIPTION - LOCATION: LOCATION: HAND

## 2023-11-18 NOTE — PROGRESS NOTES
Patient states Dr. Faizan Collins wrote her for a Z-pack yesterday for an upper respiratory infection. She wanted to know if she would start one here. She also stated she takes Unisom at home. No currently orders for either of these medications. S/w Dr Fabricio Maza. Orders received for Z-pack and benadryl 50mg as we do not stock unisom in the hospital.       0915  Patient states her family will bring up the medication and pharmacy will verify. Dr Nay Ortiz has taken over for Dr Fabricio Maza. informed. Dr Lieutenant Gonzalez stated patient taking her own medication is fine.

## 2023-11-18 NOTE — PROGRESS NOTES
Antepartum Discharge Summary     Name: Rasheed Valenzuela MRN: [id number]  SSN: [id number]    YOB: 1990  Age: 34 y.o. Sex: female      Allergies: Patient has no known allergies. Admit Date: 11/17/2023    Discharge Date: 11/18/2023     Admitting Physician: Susi Murillo MD     Attending Physician:  Susi Murillo MD     * Admission Diagnoses: Preeclampsia complicating hypertension [O11.9]    * Discharge Diagnoses:  No results found for: \"ABORH\", \"RUBELLAEXT\", \"GRBSEXT\"   Immunization History   Administered Date(s) Administered    TDaP, ADACEL (age 6y-58y), [facility] (age 10y+), IM, 0.5mL 01/14/2019       * Discharge Condition: Good and Improved    * Procedures: hospital admission  * No surgery found *      * Hospital Course:    - Pregnancy-Induced Hypertension:                               - Patient admitted with elevated blood pressure that normalized with bedrest.    * Disposition: Home    Discharge Medications:      Medication List        ASK your doctor about these medications      * albuterol (2.5 MG/3ML) 0.083% nebulizer solution  Commonly known as: PROVENTIL     * albuterol sulfate  (90 Base) MCG/ACT inhaler  Commonly known as: PROVENTIL;VENTOLIN;PROAIR     aspirin 81 MG chewable tablet     Fioricet/Codeine -20-30 MG per capsule  Generic drug: butalbital-acetaminophen-caffeine-codeine     labetalol 100 MG tablet  Commonly known as: NORMODYNE     NIFEdipine 30 MG extended release tablet  Commonly known as: PROCARDIA XL     ondansetron 8 MG Tbdp disintegrating tablet  Commonly known as: ZOFRAN-ODT     rOPINIRole 1 MG tablet  Commonly known as: REQUIP     Symbicort 160-4.5 MCG/ACT Aero  Generic drug: budesonide-formoterol           * This list has 2 medication(s) that are the same as other medications prescribed for you. Read the directions carefully, and ask your doctor or other care provider to review them with you.                   * Follow-up Care/Patient

## 2023-11-30 ENCOUNTER — HOSPITAL ENCOUNTER (INPATIENT)
Facility: HOSPITAL | Age: 33
LOS: 1 days | Discharge: HOME | End: 2023-12-01
Attending: SPECIALIST | Admitting: SPECIALIST
Payer: COMMERCIAL

## 2023-11-30 PROBLEM — O14.93 PREECLAMPSIA, THIRD TRIMESTER: Status: ACTIVE | Noted: 2023-11-30

## 2023-11-30 LAB
ALBUMIN SERPL-MCNC: 2.8 G/DL (ref 3.5–5)
ALBUMIN/GLOB SERPL: 0.6 (ref 1.1–2.2)
ALP SERPL-CCNC: 115 U/L (ref 45–117)
ALT SERPL-CCNC: 24 U/L (ref 12–78)
ANION GAP SERPL CALC-SCNC: 7 MMOL/L (ref 5–15)
APPEARANCE UR: ABNORMAL
AST SERPL-CCNC: 18 U/L (ref 15–37)
BASOPHILS # BLD: 0.1 K/UL (ref 0–0.1)
BASOPHILS NFR BLD: 1 % (ref 0–1)
BILIRUB SERPL-MCNC: 0.4 MG/DL (ref 0.2–1)
BILIRUB UR QL: NEGATIVE
BUN SERPL-MCNC: 9 MG/DL (ref 6–20)
BUN/CREAT SERPL: 17 (ref 12–20)
CALCIUM SERPL-MCNC: 9 MG/DL (ref 8.5–10.1)
CHLORIDE SERPL-SCNC: 105 MMOL/L (ref 97–108)
CO2 SERPL-SCNC: 22 MMOL/L (ref 21–32)
COLOR UR: ABNORMAL
CREAT SERPL-MCNC: 0.53 MG/DL (ref 0.55–1.02)
CREAT UR-MCNC: 89.7 MG/DL
DIFFERENTIAL METHOD BLD: ABNORMAL
EOSINOPHIL # BLD: 0.8 K/UL (ref 0–0.4)
EOSINOPHIL NFR BLD: 5 % (ref 0–7)
ERYTHROCYTE [DISTWIDTH] IN BLOOD BY AUTOMATED COUNT: 12.2 % (ref 11.5–14.5)
GLOBULIN SER CALC-MCNC: 4.8 G/DL (ref 2–4)
GLUCOSE SERPL-MCNC: 89 MG/DL (ref 65–100)
GLUCOSE UR STRIP.AUTO-MCNC: NEGATIVE MG/DL
HCT VFR BLD AUTO: 33.1 % (ref 35–47)
HGB BLD-MCNC: 11.2 G/DL (ref 11.5–16)
HGB UR QL STRIP: NEGATIVE
IMM GRANULOCYTES # BLD AUTO: 0.1 K/UL (ref 0–0.04)
IMM GRANULOCYTES NFR BLD AUTO: 1 % (ref 0–0.5)
KETONES UR QL STRIP.AUTO: NEGATIVE MG/DL
LDH SERPL L TO P-CCNC: 170 U/L (ref 81–246)
LEUKOCYTE ESTERASE UR QL STRIP.AUTO: ABNORMAL
LYMPHOCYTES # BLD: 2.5 K/UL (ref 0.8–3.5)
LYMPHOCYTES NFR BLD: 16 % (ref 12–49)
MCH RBC QN AUTO: 29.2 PG (ref 26–34)
MCHC RBC AUTO-ENTMCNC: 33.8 G/DL (ref 30–36.5)
MCV RBC AUTO: 86.4 FL (ref 80–99)
MONOCYTES # BLD: 0.7 K/UL (ref 0–1)
MONOCYTES NFR BLD: 5 % (ref 5–13)
NEUTS SEG # BLD: 11.7 K/UL (ref 1.8–8)
NEUTS SEG NFR BLD: 72 % (ref 32–75)
NITRITE UR QL STRIP.AUTO: NEGATIVE
NRBC # BLD: 0 K/UL (ref 0–0.01)
NRBC BLD-RTO: 0 PER 100 WBC
PH UR STRIP: 7 (ref 5–8)
PLATELET # BLD AUTO: 456 K/UL (ref 150–400)
PMV BLD AUTO: 9 FL (ref 8.9–12.9)
POTASSIUM SERPL-SCNC: 3.9 MMOL/L (ref 3.5–5.1)
PROT SERPL-MCNC: 7.6 G/DL (ref 6.4–8.2)
PROT UR STRIP-MCNC: ABNORMAL MG/DL
PROT UR-MCNC: 30 MG/DL (ref 0–11.9)
PROT/CREAT UR-RTO: 0.3
RBC # BLD AUTO: 3.83 M/UL (ref 3.8–5.2)
SODIUM SERPL-SCNC: 134 MMOL/L (ref 136–145)
SP GR UR REFRACTOMETRY: 1.01
URATE SERPL-MCNC: 2.8 MG/DL (ref 2.6–6)
UROBILINOGEN UR QL STRIP.AUTO: 1 EU/DL (ref 0.2–1)
WBC # BLD AUTO: 15.9 K/UL (ref 3.6–11)

## 2023-11-30 PROCEDURE — 1120000000 HC RM PRIVATE OB

## 2023-11-30 PROCEDURE — 6370000000 HC RX 637 (ALT 250 FOR IP): Performed by: SPECIALIST

## 2023-11-30 PROCEDURE — 81001 URINALYSIS AUTO W/SCOPE: CPT

## 2023-11-30 PROCEDURE — 83615 LACTATE (LD) (LDH) ENZYME: CPT

## 2023-11-30 PROCEDURE — 99214 OFFICE O/P EST MOD 30 MIN: CPT

## 2023-11-30 PROCEDURE — 84156 ASSAY OF PROTEIN URINE: CPT

## 2023-11-30 PROCEDURE — 84550 ASSAY OF BLOOD/URIC ACID: CPT

## 2023-11-30 PROCEDURE — 36415 COLL VENOUS BLD VENIPUNCTURE: CPT

## 2023-11-30 PROCEDURE — 85025 COMPLETE CBC W/AUTO DIFF WBC: CPT

## 2023-11-30 PROCEDURE — 80053 COMPREHEN METABOLIC PANEL: CPT

## 2023-11-30 PROCEDURE — 82570 ASSAY OF URINE CREATININE: CPT

## 2023-11-30 RX ORDER — NIFEDIPINE 30 MG/1
30 TABLET, EXTENDED RELEASE ORAL 2 TIMES DAILY
Status: DISCONTINUED | OUTPATIENT
Start: 2023-11-30 | End: 2023-12-01 | Stop reason: HOSPADM

## 2023-11-30 RX ORDER — LABETALOL 100 MG/1
100 TABLET, FILM COATED ORAL 2 TIMES DAILY
Status: DISCONTINUED | OUTPATIENT
Start: 2023-11-30 | End: 2023-12-01 | Stop reason: HOSPADM

## 2023-11-30 RX ORDER — OXYCODONE HYDROCHLORIDE AND ACETAMINOPHEN 5; 325 MG/1; MG/1
1 TABLET ORAL ONCE
Status: COMPLETED | OUTPATIENT
Start: 2023-11-30 | End: 2023-11-30

## 2023-11-30 RX ORDER — ALBUTEROL SULFATE 90 UG/1
2 AEROSOL, METERED RESPIRATORY (INHALATION) EVERY 4 HOURS PRN
Status: DISCONTINUED | OUTPATIENT
Start: 2023-11-30 | End: 2023-12-01 | Stop reason: HOSPADM

## 2023-11-30 RX ORDER — DEXTROSE, SODIUM CHLORIDE, SODIUM LACTATE, POTASSIUM CHLORIDE, AND CALCIUM CHLORIDE 5; .6; .31; .03; .02 G/100ML; G/100ML; G/100ML; G/100ML; G/100ML
INJECTION, SOLUTION INTRAVENOUS CONTINUOUS
Status: DISCONTINUED | OUTPATIENT
Start: 2023-11-30 | End: 2023-12-01 | Stop reason: HOSPADM

## 2023-11-30 RX ORDER — PROCHLORPERAZINE MALEATE 5 MG/1
5 TABLET ORAL EVERY 6 HOURS PRN
Status: DISCONTINUED | OUTPATIENT
Start: 2023-11-30 | End: 2023-12-01 | Stop reason: HOSPADM

## 2023-11-30 RX ORDER — PROCHLORPERAZINE MALEATE 5 MG/1
10 TABLET ORAL ONCE
Status: COMPLETED | OUTPATIENT
Start: 2023-11-30 | End: 2023-11-30

## 2023-11-30 RX ORDER — PROCHLORPERAZINE EDISYLATE 5 MG/ML
10 INJECTION INTRAMUSCULAR; INTRAVENOUS ONCE
Status: DISCONTINUED | OUTPATIENT
Start: 2023-11-30 | End: 2023-11-30

## 2023-11-30 RX ORDER — ASPIRIN 81 MG/1
81 TABLET, CHEWABLE ORAL DAILY
COMMUNITY

## 2023-11-30 RX ORDER — 0.9 % SODIUM CHLORIDE 0.9 %
1000 INTRAVENOUS SOLUTION INTRAVENOUS ONCE
Status: DISCONTINUED | OUTPATIENT
Start: 2023-11-30 | End: 2023-12-01 | Stop reason: HOSPADM

## 2023-11-30 RX ORDER — ACETAMINOPHEN 325 MG/1
650 TABLET ORAL EVERY 4 HOURS PRN
Status: DISCONTINUED | OUTPATIENT
Start: 2023-11-30 | End: 2023-12-01 | Stop reason: HOSPADM

## 2023-11-30 RX ORDER — ROPINIROLE 1 MG/1
1 TABLET, FILM COATED ORAL NIGHTLY
Status: DISCONTINUED | OUTPATIENT
Start: 2023-11-30 | End: 2023-12-01 | Stop reason: HOSPADM

## 2023-11-30 RX ORDER — LABETALOL 200 MG/1
200 TABLET, FILM COATED ORAL ONCE
Status: COMPLETED | OUTPATIENT
Start: 2023-11-30 | End: 2023-11-30

## 2023-11-30 RX ORDER — ONDANSETRON 4 MG/1
8 TABLET, ORALLY DISINTEGRATING ORAL EVERY 8 HOURS PRN
Status: DISCONTINUED | OUTPATIENT
Start: 2023-11-30 | End: 2023-12-01 | Stop reason: HOSPADM

## 2023-11-30 RX ORDER — ALBUTEROL SULFATE 2.5 MG/3ML
2.5 SOLUTION RESPIRATORY (INHALATION) EVERY 4 HOURS PRN
Status: DISCONTINUED | OUTPATIENT
Start: 2023-11-30 | End: 2023-12-01 | Stop reason: HOSPADM

## 2023-11-30 RX ORDER — ACETAMINOPHEN 500 MG
1000 TABLET ORAL ONCE
Status: COMPLETED | OUTPATIENT
Start: 2023-11-30 | End: 2023-11-30

## 2023-11-30 RX ORDER — BUDESONIDE AND FORMOTEROL FUMARATE DIHYDRATE 160; 4.5 UG/1; UG/1
2 AEROSOL RESPIRATORY (INHALATION)
Status: DISCONTINUED | OUTPATIENT
Start: 2023-11-30 | End: 2023-11-30 | Stop reason: CLARIF

## 2023-11-30 RX ORDER — BUTALBITAL, ACETAMINOPHEN AND CAFFEINE 50; 325; 40 MG/1; MG/1; MG/1
1 TABLET ORAL EVERY 4 HOURS PRN
Status: DISCONTINUED | OUTPATIENT
Start: 2023-11-30 | End: 2023-12-01 | Stop reason: HOSPADM

## 2023-11-30 RX ORDER — LANOLIN ALCOHOL/MO/W.PET/CERES
400 CREAM (GRAM) TOPICAL ONCE
Status: COMPLETED | OUTPATIENT
Start: 2023-11-30 | End: 2023-11-30

## 2023-11-30 RX ORDER — ONDANSETRON 2 MG/ML
4 INJECTION INTRAMUSCULAR; INTRAVENOUS EVERY 6 HOURS PRN
Status: DISCONTINUED | OUTPATIENT
Start: 2023-11-30 | End: 2023-12-01 | Stop reason: HOSPADM

## 2023-11-30 RX ADMIN — ACETAMINOPHEN 1000 MG: 500 TABLET ORAL at 16:41

## 2023-11-30 RX ADMIN — NIFEDIPINE 30 MG: 30 TABLET, EXTENDED RELEASE ORAL at 19:56

## 2023-11-30 RX ADMIN — LABETALOL HYDROCHLORIDE 200 MG: 200 TABLET, FILM COATED ORAL at 16:41

## 2023-11-30 RX ADMIN — Medication 400 MG: at 16:41

## 2023-11-30 RX ADMIN — PROCHLORPERAZINE MALEATE 10 MG: 5 TABLET ORAL at 16:41

## 2023-11-30 RX ADMIN — OXYCODONE HYDROCHLORIDE AND ACETAMINOPHEN 1 TABLET: 5; 325 TABLET ORAL at 17:48

## 2023-11-30 RX ADMIN — ROPINIROLE HYDROCHLORIDE 1 MG: 1 TABLET, FILM COATED ORAL at 20:40

## 2023-11-30 ASSESSMENT — PAIN DESCRIPTION - ONSET
ONSET: ON-GOING

## 2023-11-30 ASSESSMENT — PAIN DESCRIPTION - DESCRIPTORS
DESCRIPTORS: SHARP;THROBBING
DESCRIPTORS: THROBBING;PRESSURE
DESCRIPTORS: THROBBING
DESCRIPTORS: THROBBING

## 2023-11-30 ASSESSMENT — PAIN DESCRIPTION - ORIENTATION
ORIENTATION: ANTERIOR
ORIENTATION: POSTERIOR

## 2023-11-30 ASSESSMENT — PAIN DESCRIPTION - FREQUENCY
FREQUENCY: CONTINUOUS
FREQUENCY: INTERMITTENT
FREQUENCY: CONTINUOUS
FREQUENCY: CONTINUOUS

## 2023-11-30 ASSESSMENT — PAIN DESCRIPTION - LOCATION
LOCATION: HEAD

## 2023-11-30 ASSESSMENT — PAIN - FUNCTIONAL ASSESSMENT
PAIN_FUNCTIONAL_ASSESSMENT: PREVENTS OR INTERFERES WITH MANY ACTIVE NOT PASSIVE ACTIVITIES
PAIN_FUNCTIONAL_ASSESSMENT: PREVENTS OR INTERFERES WITH MANY ACTIVE NOT PASSIVE ACTIVITIES
PAIN_FUNCTIONAL_ASSESSMENT: PREVENTS OR INTERFERES SOME ACTIVE ACTIVITIES AND ADLS
PAIN_FUNCTIONAL_ASSESSMENT: PREVENTS OR INTERFERES WITH MANY ACTIVE NOT PASSIVE ACTIVITIES

## 2023-11-30 ASSESSMENT — PAIN SCALES - GENERAL
PAINLEVEL_OUTOF10: 7
PAINLEVEL_OUTOF10: 5
PAINLEVEL_OUTOF10: 10
PAINLEVEL_OUTOF10: 7

## 2023-11-30 NOTE — H&P
History & Physical    Name: Ivette Helm MRN: [id number]  SSN: [id number]    YOB: 1990  Age: 34 y.o. Sex: female      Subjective:     Reason for Admission:  Pregnancy and Preeclampsia    History of Present Illness: Ms. Alisha Trejo is a 34 y.o.  female with an estimated gestational age of 29w4d with Estimated Date of Delivery: 24. Patient complains of severe headache  for many  days. Pregnancy has been complicated by pregnancy induced hypertension. Patient denies vaginal bleeding  and vaginal leaking of fluid . OB History    Para Term  AB Living   3 1 1   1 1   SAB IAB Ectopic Molar Multiple Live Births             1      # Outcome Date GA Lbr Yair/2nd Weight Sex Delivery Anes PTL Lv   3 Current            2 Term 19 37w0d   M Vag-Spont   MICHELLE   1 AB              Past Medical History:   Diagnosis Date    Asthma     hospitalized for flare without ARF/ mechanical ventilation    Gestational hypertension     Migraines     Pregnant     Restless leg syndrome      Past Surgical History:   Procedure Laterality Date    HEENT      complete cleft palate repair    HEENT  cleft bone graft     HEENT  pharyngeal flap     HEENT  nose and lip prevsion     HEENT  nose and lip prevision     HEENT      unilateral left cleft lip repair     Social History     Occupational History    Not on file   Tobacco Use    Smoking status: Never    Smokeless tobacco: Never   Substance and Sexual Activity    Alcohol use: Yes     Alcohol/week: 0.0 standard drinks of alcohol    Drug use: No    Sexual activity: Not on file      Family History   Problem Relation Age of Onset    Stroke Father     Heart Disease Father     Cancer Maternal Grandmother     Diabetes Father        No Known Allergies  Prior to Admission medications    Medication Sig Start Date End Date Taking?  Authorizing Provider   butalbital-acetaminophen-caffeine (FIORICET, ESGIC) -40 MG per tablet Take 1 tablet by mouth every 4 hours as needed for Headaches 11/18/23   Prachi Robbins MD   labetalol (NORMODYNE) 100 MG tablet Take 1 tablet by mouth 2 times daily 10/20/23   Shweta Chang MD   NIFEdipine (PROCARDIA XL) 30 MG extended release tablet Take 1 tablet by mouth 2 times daily 11/2/23   Shweta Chang MD   ondansetron (ZOFRAN-ODT) 8 MG TBDP disintegrating tablet Take 1 tablet by mouth every 8 hours as needed 11/2/23   Shweta Chang MD   rOPINIRole (REQUIP) 1 MG tablet TAKE 1 TABLET BY ORAL ROUTE EVERY NIGHT AT 8 PM FOR RESTLESS LEGS 1/13/23   Shweta Chang MD   budesonide-formoterol (SYMBICORT) 160-4.5 MCG/ACT AERO TAKE 2 PUFFS BY MOUTH TWICE A DAY IN THE MORNING AND IN THE EVENING 12/21/22   Shweta Chang MD   albuterol (PROVENTIL) (2.5 MG/3ML) 0.083% nebulizer solution Inhale 3 mLs into the lungs every 4 hours as needed 5/15/13   Automatic Reconciliation, Ar   albuterol sulfate HFA (PROVENTIL;VENTOLIN;PROAIR) 108 (90 Base) MCG/ACT inhaler Inhale 2 puffs into the lungs every 4 hours as needed 5/15/13   Automatic Reconciliation, Ar        Review of Systems:  A comprehensive review of systems was negative except for that written in the History of Present Illness. Objective:     Vitals:    Vitals:    11/30/23 1547   BP: 126/84   Pulse: 87   SpO2: 100%      No data recorded. BP  Min: 126/84  Max: 126/84     Physical Exam:  Lungs CTA bilat      Hearrt reg  Ab soft, gravid, nt  Ext nt, +edema  Membranes:  Intact  Uterine Activity:  None  Fetal Heart Rate:  Reactive       Lab/Data Review:  No results found for this or any previous visit (from the past 24 hour(s)). Assessment and Plan:     Principal Problem:    Preeclampsia, third trimester  Resolved Problems:    * No resolved hospital problems.  *     - Pregnancy-Induced Hypertension:  PIH labs  Keep inpatient until furthur notice  Neurology consult tomorrow

## 2023-11-30 NOTE — PROGRESS NOTES
1542: Favian York is a 34 y.o.  at 31w3d patient of Dr Prescott Castleman at [facility] who presents to L&D triage for [facility] out. She reports Positive FM, denies vaginal bleeding, LOF, and contractions. She also denies Scotoma, RUQ pain, and Edema. Urine sample obtained. EFM and toco placed for initial assessment. 164: Blood drawn and sent to lab. : Dr Santiago Lucas advised ok to discontinue serial BP. Advised to give 5mg percocet and follow-up on headache status after an hour. : Dr Ventura Berrios at bedside to assess. Advised will admit to antepartum for observation. : Bedside and Verbal shift change report given to [address] (oncoming nurse) by GIOVANI Valentin RN (offgoing nurse). Report included the following information Nurse Handoff Report and MAR.

## 2023-12-01 VITALS
DIASTOLIC BLOOD PRESSURE: 62 MMHG | HEART RATE: 80 BPM | TEMPERATURE: 98.5 F | OXYGEN SATURATION: 99 % | SYSTOLIC BLOOD PRESSURE: 125 MMHG | RESPIRATION RATE: 16 BRPM

## 2023-12-01 LAB
ALBUMIN SERPL-MCNC: 2.6 G/DL (ref 3.5–5)
ALBUMIN/GLOB SERPL: 0.6 (ref 1.1–2.2)
ALP SERPL-CCNC: 109 U/L (ref 45–117)
ALT SERPL-CCNC: 22 U/L (ref 12–78)
ANION GAP SERPL CALC-SCNC: 6 MMOL/L (ref 5–15)
AST SERPL-CCNC: 17 U/L (ref 15–37)
BASOPHILS # BLD: 0.1 K/UL (ref 0–0.1)
BASOPHILS NFR BLD: 1 % (ref 0–1)
BILIRUB SERPL-MCNC: 0.3 MG/DL (ref 0.2–1)
BUN SERPL-MCNC: 9 MG/DL (ref 6–20)
BUN/CREAT SERPL: 14 (ref 12–20)
CALCIUM SERPL-MCNC: 9.1 MG/DL (ref 8.5–10.1)
CHLORIDE SERPL-SCNC: 106 MMOL/L (ref 97–108)
CO2 SERPL-SCNC: 25 MMOL/L (ref 21–32)
CREAT SERPL-MCNC: 0.65 MG/DL (ref 0.55–1.02)
DIFFERENTIAL METHOD BLD: ABNORMAL
EOSINOPHIL # BLD: 0.6 K/UL (ref 0–0.4)
EOSINOPHIL NFR BLD: 5 % (ref 0–7)
ERYTHROCYTE [DISTWIDTH] IN BLOOD BY AUTOMATED COUNT: 12.4 % (ref 11.5–14.5)
GLOBULIN SER CALC-MCNC: 4.6 G/DL (ref 2–4)
GLUCOSE SERPL-MCNC: 88 MG/DL (ref 65–100)
HCT VFR BLD AUTO: 30.2 % (ref 35–47)
HGB BLD-MCNC: 10.1 G/DL (ref 11.5–16)
IMM GRANULOCYTES # BLD AUTO: 0.1 K/UL (ref 0–0.04)
IMM GRANULOCYTES NFR BLD AUTO: 1 % (ref 0–0.5)
LYMPHOCYTES # BLD: 2.9 K/UL (ref 0.8–3.5)
LYMPHOCYTES NFR BLD: 24 % (ref 12–49)
MCH RBC QN AUTO: 28.4 PG (ref 26–34)
MCHC RBC AUTO-ENTMCNC: 33.4 G/DL (ref 30–36.5)
MCV RBC AUTO: 84.8 FL (ref 80–99)
MONOCYTES # BLD: 0.7 K/UL (ref 0–1)
MONOCYTES NFR BLD: 6 % (ref 5–13)
NEUTS SEG # BLD: 7.9 K/UL (ref 1.8–8)
NEUTS SEG NFR BLD: 63 % (ref 32–75)
NRBC # BLD: 0 K/UL (ref 0–0.01)
NRBC BLD-RTO: 0 PER 100 WBC
PLATELET # BLD AUTO: 434 K/UL (ref 150–400)
PMV BLD AUTO: 9.3 FL (ref 8.9–12.9)
POTASSIUM SERPL-SCNC: 4 MMOL/L (ref 3.5–5.1)
PROT SERPL-MCNC: 7.2 G/DL (ref 6.4–8.2)
RBC # BLD AUTO: 3.56 M/UL (ref 3.8–5.2)
SODIUM SERPL-SCNC: 137 MMOL/L (ref 136–145)
WBC # BLD AUTO: 12.2 K/UL (ref 3.6–11)

## 2023-12-01 PROCEDURE — 6370000000 HC RX 637 (ALT 250 FOR IP): Performed by: SPECIALIST

## 2023-12-01 PROCEDURE — 80053 COMPREHEN METABOLIC PANEL: CPT

## 2023-12-01 PROCEDURE — 36415 COLL VENOUS BLD VENIPUNCTURE: CPT

## 2023-12-01 PROCEDURE — 85025 COMPLETE CBC W/AUTO DIFF WBC: CPT

## 2023-12-01 PROCEDURE — 99222 1ST HOSP IP/OBS MODERATE 55: CPT | Performed by: INTERNAL MEDICINE

## 2023-12-01 RX ORDER — NIFEDIPINE 30 MG/1
30 TABLET, EXTENDED RELEASE ORAL 2 TIMES DAILY
Qty: 30 TABLET | Refills: 0 | Status: SHIPPED | OUTPATIENT
Start: 2023-12-01

## 2023-12-01 RX ADMIN — NIFEDIPINE 30 MG: 30 TABLET, EXTENDED RELEASE ORAL at 08:31

## 2023-12-01 RX ADMIN — LABETALOL HYDROCHLORIDE 100 MG: 100 TABLET, FILM COATED ORAL at 08:31

## 2023-12-01 ASSESSMENT — PAIN DESCRIPTION - ONSET: ONSET: ON-GOING

## 2023-12-01 ASSESSMENT — PAIN DESCRIPTION - FREQUENCY: FREQUENCY: INTERMITTENT

## 2023-12-01 NOTE — DISCHARGE SUMMARY
Obstetrical Discharge Summary     Name: Dalila Davis MRN: [id number]  SSN: [id number]    YOB: 1990  Age: 34 y.o. Sex: female      Allergies: Patient has no known allergies. Admit Date: 11/30/2023    Discharge Date: 12/1/2023     Admitting Physician: Herlinda Parada MD     Attending Physician:  No att. providers found     * Admission Diagnoses: Preeclampsia, third trimester [O14.93]    * Discharge Diagnoses: This patient has no babies on file. This patient has no babies on file. This patient has no babies on file. This patient has no babies on file. Additional Diagnoses:   Principal Problem:    Preeclampsia, third trimester  Resolved Problems:    * No resolved hospital problems. *     No results found for: \"ABORH\", \"RUBELLAEXT\", \"GRBSEXT\"     Immunization History   Administered Date(s) Administered    TDaP, ADACEL (age 6y-58y), [facility] [place] [facility]age 10y+[facility] IM, 0.5mL 01/14/2019       * Procedures:   * No surgery found *       * Discharge Condition: Improved         * Disposition: Home    Discharge Medications:      Medication List        CONTINUE taking these medications      * albuterol (2.5 MG/3ML) 0.083% nebulizer solution  Commonly known as: PROVENTIL     * albuterol sulfate  (90 Base) MCG/ACT inhaler  Commonly known as: PROVENTIL;VENTOLIN;PROAIR     aspirin 81 MG chewable tablet     butalbital-acetaminophen-caffeine -40 MG per tablet  Commonly known as: FIORICET, ESGIC  Take 1 tablet by mouth every 4 hours as needed for Headaches     NIFEdipine 30 MG extended release tablet  Commonly known as: PROCARDIA XL  Take 1 tablet by mouth 2 times daily     Symbicort 160-4.5 MCG/ACT Aero  Generic drug: budesonide-formoterol           * This list has 2 medication(s) that are the same as other medications prescribed for you. Read the directions carefully, and ask your doctor or other care provider to review them with you.                 STOP taking these medications      labetalol

## 2023-12-01 NOTE — PROGRESS NOTES
1910- Report with Carmela Escobar RN. Patient will stay overnight for observation. Patient moved to  4. Dr Jamal Lewis advises to take BP every 2 hours. Bedside report with Naomi Pineda RN. Care relinquished of pt.

## 2023-12-01 NOTE — CONSULTS
Date of Consultation:  December 1, 2023    Referring Physician: Prescott Castleman, MD     Reason for Consultation:  headache     Chief Complaint   Patient presents with    Hypertension       History of Present Illness:   Favian York is a 34 y.o. female with history of gestational hypertension, RLS, history of concussion, chronic migraine and cluster headaches, asthma who presents approximately 31 weeks pregnant with new type of headache. Reportedly she has been having headaches on and off during this pregnancy. She does have a history of chronic migraines and cluster headaches as well as postconcussive headaches for which she has seen neurology in the past.  She states that this is not typical of her migraine headache as her migraine headaches are typically on the left side and affect the left side of her body. She describes this headache as midline frontal in her forehead which radiates back into her neck. Several weeks ago when she presented with a headache it was radiating from the back of her neck and forward. She does have some sensitivity to light with this headache but no sensitivity to sound. Does have some mild nausea, no vomiting. No visual changes. She does have occasional visual components with her migraines however is not currently experiencing that. Denies any pressure type sensation, tinnitus or loss of vision in her periphery. Denies any double vision. Denies any speech or language dysfunction, focal paresthesias or weakness, vertigo. Currently she is very comfortable and states she does not have a headache right now. She initially responded to Imitrex however does not feel that its been helping as much recently. She does not feel that Fioricet with codeine has helped either. She did get Percocet and Compazine which she feels has been helping. She is also on labetalol at home. She does have trace protein in her UA, as well as elevated protein and random urine sample.   Her blood

## 2023-12-01 NOTE — PROGRESS NOTES
RATIO URINE RAN 0.3     Urinalysis    Collection Time: 11/30/23  5:03 PM   Result Value Ref Range    Color, UA YELLOW/STRAW      Appearance CLOUDY (A) CLEAR      Specific Gravity, UA 1.015      pH, Urine 7.0 5.0 - 8.0      Protein, UA TRACE (A) NEG mg/dL    Glucose, UA Negative NEG mg/dL    Ketones, Urine Negative NEG mg/dL    Bilirubin Urine Negative NEG      Blood, Urine Negative NEG      Urobilinogen, Urine 1.0 0.2 - 1.0 EU/dL    Nitrite, Urine Negative NEG      Leukocyte Esterase, Urine MODERATE (A) NEG     CBC with Auto Differential    Collection Time: 12/01/23  5:10 AM   Result Value Ref Range    WBC 12.2 (H) 3.6 - 11.0 K/uL    RBC 3.56 (L) 3.80 - 5.20 M/uL    Hemoglobin 10.1 (L) 11.5 - 16.0 g/dL    Hematocrit 30.2 (L) 35.0 - 47.0 %    MCV 84.8 80.0 - 99.0 FL    MCH 28.4 26.0 - 34.0 PG    MCHC 33.4 30.0 - 36.5 g/dL    RDW 12.4 11.5 - 14.5 %    Platelets 713 (H) 690 - 400 K/uL    MPV 9.3 8.9 - 12.9 FL    Nucleated RBCs 0.0 0  WBC    nRBC 0.00 0.00 - 0.01 K/uL    Neutrophils % 63 32 - 75 %    Lymphocytes % 24 12 - 49 %    Monocytes % 6 5 - 13 %    Eosinophils % 5 0 - 7 %    Basophils % 1 0 - 1 %    Immature Granulocytes 1 (H) 0.0 - 0.5 %    Neutrophils Absolute 7.9 1.8 - 8.0 K/UL    Lymphocytes Absolute 2.9 0.8 - 3.5 K/UL    Monocytes Absolute 0.7 0.0 - 1.0 K/UL    Eosinophils Absolute 0.6 (H) 0.0 - 0.4 K/UL    Basophils Absolute 0.1 0.0 - 0.1 K/UL    Absolute Immature Granulocyte 0.1 (H) 0.00 - 0.04 K/UL    Differential Type AUTOMATED     Comprehensive Metabolic Panel    Collection Time: 12/01/23  5:10 AM   Result Value Ref Range    Sodium 137 136 - 145 mmol/L    Potassium 4.0 3.5 - 5.1 mmol/L    Chloride 106 97 - 108 mmol/L    CO2 25 21 - 32 mmol/L    Anion Gap 6 5 - 15 mmol/L    Glucose 88 65 - 100 mg/dL    BUN 9 6 - 20 MG/DL    Creatinine 0.65 0.55 - 1.02 MG/DL    Bun/Cre Ratio 14 12 - 20      Est, Glom Filt Rate >60 >60 ml/min/1.73m2    Calcium 9.1 8.5 - 10.1 MG/DL    Total Bilirubin 0.3 0.2 - 1.0 MG/DL    ALT 22 12 - 78 U/L    AST 17 15 - 37 U/L    Alk Phosphatase 109 45 - 117 U/L    Total Protein 7.2 6.4 - 8.2 g/dL    Albumin 2.6 (L) 3.5 - 5.0 g/dL    Globulin 4.6 (H) 2.0 - 4.0 g/dL    Albumin/Globulin Ratio 0.6 (L) 1.1 - 2.2         Assessment: 31w4d   Hypertension, gestational superimposed preeclampsia mild, will discharge home on labetalol to follow up 3 days    Plan:  Discharge home with the following:. Follow up in office: this week. Medications: prenatal vitamins and labetalol.

## 2023-12-01 NOTE — PROGRESS NOTES
4013- Neuro consult at bedside. Suggested potentially trying Mag oxide for headaches. Does not want scan at this time. No new orders at this time. 1200- reviewed d/c instructions with pt and mom at bedside. Pt will follow up Monday with Dr. Louann Kitchen. IV removed.

## 2024-08-06 ASSESSMENT — ENCOUNTER SYMPTOMS: SHORTNESS OF BREATH: 0

## 2024-08-06 NOTE — PROGRESS NOTES
HISTORY OF PRESENT ILLNESS  Alyson Ghosh is a 34 y.o. female.  HPI    This is an established visit completed with telemedicine was completed with video assist. The patient acknowledges and agrees to this method of visitation.  Pt is here for routine care.     Pt was last seen 11/10/23.      Htn  BP today xx  BP at home xx  Labetalol 100mg once daily and nifedipine 30mg BID     Wt is 165 lbs per pt     She was admitted to the hospital 11/30/24 for pregnancy and preeclampsia w/headache by her ob/gyn Dr Sales.   Reviewed admission note:  Principal Problem:    Preeclampsia, third trimester  Resolved Problems:    * No resolved hospital problems. *     - Pregnancy-Induced Hypertension:  PIH labs  Keep inpatient until furthur notice  Neurology consult tomorrow      Consult Dr Case (neuro) 12/1/23  Reviewed note:  IMPRESSION/RECOMMENDATIONS:  Alyson Ghosh is a 33 y.o. female, 31 weeks pregnant with her second pregnancy, presenting with headache, atypical of her migraine and cluster headaches.  She is very comfortable on my examination and does not currently complain of a headache.  She has no neurological symptoms that are concerning for CVST, and no abnormal findings on neurological exam.  I do not suspect CVST or space-occupying lesions, therefore could defer imaging.  These do not sound like migraine headaches or cluster headaches and they are atypical of her migraines.  Given her blood pressure and lab findings, her presentation is consistent with preeclampsia, I will defer management of this to OB.  No seizures and therefore does not appear to be severe.       For management of pain, may continue Fioricet with or without codeine, Tylenol, Benadryl for nausea.  Could see if a low dose of Solu-Medrol could break the headache cycle if she develops severe headaches again however would defer to OB regarding side effects of Solu-Medrol in pregnant patients.     We did discuss magnesium however given that she 
service, which includes applicable co-pays. This Virtual Visit was conducted with patient's (and/or legal guardian's) consent. Patient identification was verified, and a caregiver was present when appropriate.   The patient was located at Home: [id number] [facility] [address]  Provider was located at Home (Appt Dept State[place]  Confirm you are appropriately licensed, registered, or certified to deliver care in the state where the patient is located as indicated above. If you are not or unsure, please re-schedule the visit: Yes, I confirm.      --Concha Gale MD on 8/9/2024 at 8:59 AM  An electronic signature was used to authenticate this note.

## 2024-08-09 ENCOUNTER — TELEMEDICINE (OUTPATIENT)
Age: 34
End: 2024-08-09
Payer: COMMERCIAL

## 2024-08-09 ENCOUNTER — PATIENT MESSAGE (OUTPATIENT)
Age: 34
End: 2024-08-09

## 2024-08-09 DIAGNOSIS — J45.20 MILD INTERMITTENT ASTHMA WITHOUT COMPLICATION: ICD-10-CM

## 2024-08-09 DIAGNOSIS — I10 PRIMARY HYPERTENSION: Primary | ICD-10-CM

## 2024-08-09 DIAGNOSIS — G44.229 CHRONIC TENSION-TYPE HEADACHE, NOT INTRACTABLE: ICD-10-CM

## 2024-08-09 DIAGNOSIS — G25.81 RLS (RESTLESS LEGS SYNDROME): ICD-10-CM

## 2024-08-09 DIAGNOSIS — F90.2 ATTENTION DEFICIT HYPERACTIVITY DISORDER (ADHD), COMBINED TYPE: ICD-10-CM

## 2024-08-09 PROCEDURE — 99214 OFFICE O/P EST MOD 30 MIN: CPT | Performed by: INTERNAL MEDICINE

## 2024-08-09 RX ORDER — LABETALOL 100 MG/1
200 TABLET, FILM COATED ORAL DAILY
COMMUNITY
Start: 2023-07-24

## 2024-08-09 RX ORDER — DEXTROAMPHETAMINE SACCHARATE, AMPHETAMINE ASPARTATE MONOHYDRATE, DEXTROAMPHETAMINE SULFATE AND AMPHETAMINE SULFATE 2.5; 2.5; 2.5; 2.5 MG/1; MG/1; MG/1; MG/1
10 CAPSULE, EXTENDED RELEASE ORAL EVERY MORNING
COMMUNITY

## 2024-08-09 RX ORDER — SUMATRIPTAN 50 MG/1
50 TABLET, FILM COATED ORAL
COMMUNITY
End: 2024-08-09 | Stop reason: SDUPTHER

## 2024-08-09 RX ORDER — VENLAFAXINE HYDROCHLORIDE 75 MG/1
75 CAPSULE, EXTENDED RELEASE ORAL DAILY
COMMUNITY

## 2024-08-09 RX ORDER — ONDANSETRON 4 MG/1
TABLET, ORALLY DISINTEGRATING ORAL
COMMUNITY
End: 2024-08-09 | Stop reason: SDUPTHER

## 2024-08-09 RX ORDER — ROPINIROLE 1 MG/1
2 TABLET, FILM COATED ORAL NIGHTLY
COMMUNITY
End: 2024-08-09 | Stop reason: SDUPTHER

## 2024-08-09 RX ORDER — ROPINIROLE 1 MG/1
1 TABLET, FILM COATED ORAL NIGHTLY
Qty: 90 TABLET | Refills: 1 | Status: SHIPPED | OUTPATIENT
Start: 2024-08-09

## 2024-08-09 RX ORDER — HYDROCHLOROTHIAZIDE 25 MG/1
25 TABLET ORAL EVERY MORNING
Qty: 90 TABLET | Refills: 1 | Status: SHIPPED | OUTPATIENT
Start: 2024-08-09

## 2024-08-09 RX ORDER — ALBUTEROL SULFATE 90 UG/1
2 AEROSOL, METERED RESPIRATORY (INHALATION) EVERY 4 HOURS PRN
Qty: 18 G | Refills: 0 | Status: SHIPPED | OUTPATIENT
Start: 2024-08-09

## 2024-08-09 RX ORDER — ALBUTEROL SULFATE 2.5 MG/3ML
2.5 SOLUTION RESPIRATORY (INHALATION) EVERY 4 HOURS PRN
Qty: 120 EACH | Status: CANCELLED | OUTPATIENT
Start: 2024-08-09

## 2024-08-09 RX ORDER — ONDANSETRON 4 MG/1
TABLET, ORALLY DISINTEGRATING ORAL
Qty: 20 TABLET | Refills: 0 | Status: SHIPPED | OUTPATIENT
Start: 2024-08-09

## 2024-08-09 RX ORDER — SUMATRIPTAN 50 MG/1
50 TABLET, FILM COATED ORAL
Qty: 9 TABLET | Refills: 0 | Status: SHIPPED | OUTPATIENT
Start: 2024-08-09 | End: 2024-08-09

## 2024-08-09 RX ORDER — LABETALOL 100 MG/1
200 TABLET, FILM COATED ORAL DAILY
Qty: 180 TABLET | Refills: 1 | Status: CANCELLED | OUTPATIENT
Start: 2024-08-09

## 2024-08-09 ASSESSMENT — PATIENT HEALTH QUESTIONNAIRE - PHQ9
1. LITTLE INTEREST OR PLEASURE IN DOING THINGS: NOT AT ALL
SUM OF ALL RESPONSES TO PHQ QUESTIONS 1-9: 0
SUM OF ALL RESPONSES TO PHQ QUESTIONS 1-9: 0
2. FEELING DOWN, DEPRESSED OR HOPELESS: NOT AT ALL
SUM OF ALL RESPONSES TO PHQ QUESTIONS 1-9: 0
SUM OF ALL RESPONSES TO PHQ QUESTIONS 1-9: 0
SUM OF ALL RESPONSES TO PHQ9 QUESTIONS 1 & 2: 0

## 2024-08-14 RX ORDER — LABETALOL 100 MG/1
100 TABLET, FILM COATED ORAL 2 TIMES DAILY
Qty: 60 TABLET | Refills: 3 | Status: SHIPPED | OUTPATIENT
Start: 2024-08-14

## 2024-08-14 RX ORDER — ROPINIROLE 1 MG/1
2 TABLET, FILM COATED ORAL NIGHTLY
Qty: 30 TABLET | Refills: 0 | Status: SHIPPED | OUTPATIENT
Start: 2024-08-14

## 2024-08-14 NOTE — TELEPHONE ENCOUNTER
PCP: Concha Gale MD    Last appt: 8/9/2024  Future Appointments   Date Time Provider Department Center   10/1/2024  9:15 AM Concha Gale MD [facility] DEP       Requested Prescriptions     Pending Prescriptions Disp Refills    labetalol (NORMODYNE) 100 MG tablet 60 tablet 3     Sig: Take 1 tablet by mouth 2 times daily    rOPINIRole (REQUIP) 1 MG tablet 30 tablet 0     Sig: Take 2 tablets by mouth nightly

## 2024-08-14 NOTE — TELEPHONE ENCOUNTER
PCP: Concha Gale MD    Last appt: 8/9/2024  Future Appointments   Date Time Provider Department Center   10/1/2024  9:15 AM Concha Gale MD [facility] DEP       Requested Prescriptions     Pending Prescriptions Disp Refills    labetalol (NORMODYNE) 100 MG tablet 60 tablet 3     Sig: Take 1 tablet by mouth 2 times daily

## 2024-09-05 RX ORDER — ALBUTEROL SULFATE 90 UG/1
AEROSOL, METERED RESPIRATORY (INHALATION)
Qty: 18 EACH | Refills: 3 | Status: SHIPPED | OUTPATIENT
Start: 2024-09-05

## 2024-10-01 ENCOUNTER — TELEPHONE (OUTPATIENT)
Age: 34
End: 2024-10-01

## 2024-10-03 ENCOUNTER — TELEPHONE (OUTPATIENT)
Age: 34
End: 2024-10-03

## 2025-03-30 RX ORDER — ROPINIROLE 1 MG/1
1 TABLET, FILM COATED ORAL NIGHTLY
Qty: 60 TABLET | Refills: 0 | Status: SHIPPED | OUTPATIENT
Start: 2025-03-30

## 2025-06-27 RX ORDER — ROPINIROLE 1 MG/1
1 TABLET, FILM COATED ORAL NIGHTLY
Qty: 90 TABLET | Refills: 1 | OUTPATIENT
Start: 2025-06-27

## 2025-07-01 RX ORDER — ROPINIROLE 1 MG/1
1 TABLET, FILM COATED ORAL NIGHTLY
Qty: 60 TABLET | Refills: 0 | OUTPATIENT
Start: 2025-07-01